# Patient Record
Sex: MALE | Race: WHITE | NOT HISPANIC OR LATINO
[De-identification: names, ages, dates, MRNs, and addresses within clinical notes are randomized per-mention and may not be internally consistent; named-entity substitution may affect disease eponyms.]

---

## 2022-11-17 ENCOUNTER — TRANSCRIPTION ENCOUNTER (OUTPATIENT)
Age: 37
End: 2022-11-17

## 2022-11-17 ENCOUNTER — APPOINTMENT (OUTPATIENT)
Dept: ORTHOPEDIC SURGERY | Facility: CLINIC | Age: 37
End: 2022-11-17

## 2022-11-17 VITALS — HEIGHT: 69 IN | BODY MASS INDEX: 27.4 KG/M2 | WEIGHT: 185 LBS

## 2022-11-17 DIAGNOSIS — Z78.9 OTHER SPECIFIED HEALTH STATUS: ICD-10-CM

## 2022-11-17 PROCEDURE — 99203 OFFICE O/P NEW LOW 30 MIN: CPT

## 2022-11-17 PROCEDURE — 73564 X-RAY EXAM KNEE 4 OR MORE: CPT | Mod: RT

## 2022-11-17 RX ORDER — IBUPROFEN 800 MG/1
800 TABLET, FILM COATED ORAL
Qty: 30 | Refills: 0 | Status: ACTIVE | COMMUNITY
Start: 2022-10-25

## 2022-11-17 NOTE — DISCUSSION/SUMMARY
[de-identified] : The documentation recorded by the scribe accurately reflects the service I personally performed and the decisions made by me.\par I, Peter Batista, attest that this documentation has been prepared under the direction and in the presence of Provider Scout Abraham MD.\par \par The patient was seen by Scout Abraham MD.\par The following radiographs were ordered and read by me during this patient's visit. I reviewed each radiograph in detail with the patient, and discussed the findings as highlighted below.\par

## 2022-11-17 NOTE — ASSESSMENT
[FreeTextEntry1] : Underlying pathology reviewed and treatment options discussed. \par 11/17/2022 : xrays right knee, 4 views,   negative\par Obtain MRI right knee r/o MMT. \par Activity modifier as tolerated.\par Tried 800 mg nsaids but caused GI issues. \par Questions addressed.\par \par

## 2022-11-17 NOTE — HISTORY OF PRESENT ILLNESS
[Gradual] : gradual [8] : 8 [0] : 0 [Localized] : localized [Sharp] : sharp [Tightness] : tightness [Intermittent] : intermittent [Leisure] : leisure [Nothing helps with pain getting better] : Nothing helps with pain getting better [Exercising] : exercising [Stairs] : stairs [] : no [FreeTextEntry1] : right knee [FreeTextEntry5] : New patient is here for right knee. NKI. Pain has been intermittent for 2 years and has become more persistent in the past 3 months. Pain is mostly when he squats.  [FreeTextEntry6] : pulling [de-identified] : squat

## 2022-11-17 NOTE — REASON FOR VISIT
[FreeTextEntry2] : 36yo male with right knee pain ongoing intermittently for years, worsening over the last 3 months. He lifts weights regularly. Was taking NSAIDs without relief, then 3 weeks ago had severe pain which woke him at night. Saw another orthopedist who started him on ibuprofen 800mg which has not helped and is hurting his stomach.

## 2022-11-17 NOTE — PHYSICAL EXAM
[Right] : right knee [5___] : hamstring 5[unfilled]/5 [Equivocal] : equivocal Ginette [] : no deformities of patellar tendon [TWNoteComboBox7] : flexion 130 degrees [de-identified] : extension 0 degrees

## 2022-11-28 ENCOUNTER — FORM ENCOUNTER (OUTPATIENT)
Age: 37
End: 2022-11-28

## 2022-11-29 ENCOUNTER — APPOINTMENT (OUTPATIENT)
Dept: MRI IMAGING | Facility: CLINIC | Age: 37
End: 2022-11-29

## 2022-11-29 PROCEDURE — 73721 MRI JNT OF LWR EXTRE W/O DYE: CPT | Mod: RT

## 2022-12-01 ENCOUNTER — APPOINTMENT (OUTPATIENT)
Dept: ORTHOPEDIC SURGERY | Facility: CLINIC | Age: 37
End: 2022-12-01

## 2022-12-01 VITALS — BODY MASS INDEX: 27.4 KG/M2 | WEIGHT: 185 LBS | HEIGHT: 69 IN

## 2022-12-01 DIAGNOSIS — S76.111A STRAIN OF RIGHT QUADRICEPS MUSCLE, FASCIA AND TENDON, INITIAL ENCOUNTER: ICD-10-CM

## 2022-12-01 PROCEDURE — 99213 OFFICE O/P EST LOW 20 MIN: CPT

## 2022-12-01 NOTE — ASSESSMENT
[FreeTextEntry1] : Underlying pathology reviewed and treatment options discussed. \par 11/17/2022 : xrays right knee, 4 views,   negative\par Obtain MRI right knee r/o MMT. \par Activity modifier as tolerated.\par Tried 800 mg nsaids but caused GI issues. \par Questions addressed.\par \par 12/01/2022: MRI reviewed and discussed.\par reports leg strength is much high than his upper body. \par improve mech and flexibility\par Activity modifier as tolerated.\par Start PT and HEP to improve mechanics and reduce pain.\par Questions addressed.\par \par \par The documentation recorded by the scribe accurately reflects the service I personally performed and the decisions made by me.\par I, Adrian Batistaibkaren, attest that this documentation has been prepared under the direction and in the presence of Provider Scout Abraham MD.\par \par The patient was seen by Scout Abraham MD.\par

## 2022-12-01 NOTE — PHYSICAL EXAM
[Right] : right knee [5___] : hamstring 5[unfilled]/5 [Equivocal] : equivocal Ginette [] : no deformities of patellar tendon [TWNoteComboBox7] : flexion 130 degrees [de-identified] : extension 0 degrees

## 2022-12-01 NOTE — DATA REVIEWED
[MRI] : MRI [Right] : of the right [Knee] : knee [Report was reviewed and noted in the chart] : The report was reviewed and noted in the chart [I reviewed the films/CD and agree] : I reviewed the films/CD and agree [FreeTextEntry1] : Impression: right knee\par 1. Thick medial synovial plica with mild patellofemoral effusion and synovitis.\par 2. Distal quadriceps tendinopathy without tear.\par 3. No acute osseous injury or meniscal tear.\par E-signed by Presley Osborn MD 11/30/2022

## 2022-12-01 NOTE — HISTORY OF PRESENT ILLNESS
[Gradual] : gradual [8] : 8 [0] : 0 [Localized] : localized [Sharp] : sharp [Tightness] : tightness [Intermittent] : intermittent [Leisure] : leisure [Nothing helps with pain getting better] : Nothing helps with pain getting better [Exercising] : exercising [Stairs] : stairs [de-identified] : 12/01/2022\par here for mri review. \par \par 11/17/22: 38yo male with right knee pain ongoing intermittently for years, worsening over the last 3 months. He lifts weights regularly. Was taking NSAIDs without relief, then 3 weeks ago had severe pain which woke him at night. Saw another orthopedist who started him on ibuprofen 800mg which has not helped and is hurting his stomach.  [] : no [FreeTextEntry1] : right knee [FreeTextEntry5] : Patient is here today for MRI results of right knee. Patient states there has been no improvement in pain since last visit.\par  [FreeTextEntry6] : pulling [de-identified] : squat

## 2023-06-16 ENCOUNTER — APPOINTMENT (OUTPATIENT)
Dept: ORTHOPEDIC SURGERY | Facility: CLINIC | Age: 38
End: 2023-06-16
Payer: COMMERCIAL

## 2023-06-16 DIAGNOSIS — S80.01XA CONTUSION OF RIGHT KNEE, INITIAL ENCOUNTER: ICD-10-CM

## 2023-06-16 PROCEDURE — 99213 OFFICE O/P EST LOW 20 MIN: CPT

## 2023-06-16 PROCEDURE — 73562 X-RAY EXAM OF KNEE 3: CPT | Mod: RT

## 2023-06-16 NOTE — HISTORY OF PRESENT ILLNESS
[10] : 10 [0] : 0 [Rest] : rest [Meds] : meds [Stairs] : stairs [de-identified] : 6/16/23:  acute onset of right knee pain after another player landed and fell on his knee on 6/4/23.  pain increased 6/11/23 after playing hockey.  pain continues.  reports to buckling as well.  [] : no [FreeTextEntry1] : right knee  [FreeTextEntry3] : 06/04/2023 [FreeTextEntry5] : pt was playing hockey and he fell and someone then fell on top of him and he injured his right knee almost two weeks ago. pt states he played again a few days ago and felt increased pain  [FreeTextEntry9] : ibuprofen  [de-identified] : movement  [de-identified] : 12/2022 [de-identified] : Dr.Price [de-identified] : 12/2022

## 2023-06-16 NOTE — DISCUSSION/SUMMARY
[de-identified] : Progress Note completed by Lynnette Hood PA-C\par * Dr. Donis -- The documentation recorded in this note accurately reflects the decisions made by me during this visit.

## 2023-06-16 NOTE — ASSESSMENT
[FreeTextEntry1] : acute onset of right knee pain after another player landed and fell on his knee on 6/4/23.  pain increased 6/11/23 after playing hockey.   lateral knee pain and some buckling.  possible lmt versus bone contusion. \par \par works in finance.

## 2023-06-16 NOTE — PHYSICAL EXAM
[NL (0)] : extension 0 degrees [4___] : hamstring 4[unfilled]/5 [Positive] : positive Ginette [] : patient ambulates without assistive device [Right] : right knee [There are no fractures, subluxations or dislocations. No significant abnormalities are seen] : There are no fractures, subluxations or dislocations. No significant abnormalities are seen [TWNoteComboBox7] : flexion 125 degrees

## 2023-06-21 ENCOUNTER — APPOINTMENT (OUTPATIENT)
Dept: MRI IMAGING | Facility: CLINIC | Age: 38
End: 2023-06-21
Payer: COMMERCIAL

## 2023-06-21 PROCEDURE — 73721 MRI JNT OF LWR EXTRE W/O DYE: CPT | Mod: RT

## 2023-06-22 ENCOUNTER — APPOINTMENT (OUTPATIENT)
Dept: ORTHOPEDIC SURGERY | Facility: CLINIC | Age: 38
End: 2023-06-22
Payer: COMMERCIAL

## 2023-06-22 VITALS — BODY MASS INDEX: 27.4 KG/M2 | WEIGHT: 185 LBS | HEIGHT: 69 IN

## 2023-06-22 DIAGNOSIS — S82.141D DISPLACED BICONDYLAR FRACTURE OF RIGHT TIBIA, SUBSEQUENT ENCOUNTER FOR CLOSED FRACTURE WITH ROUTINE HEALING: ICD-10-CM

## 2023-06-22 DIAGNOSIS — M23.91 UNSPECIFIED INTERNAL DERANGEMENT OF RIGHT KNEE: ICD-10-CM

## 2023-06-22 DIAGNOSIS — S83.90XA SPRAIN OF UNSPECIFIED SITE OF UNSPECIFIED KNEE, INITIAL ENCOUNTER: ICD-10-CM

## 2023-06-22 PROCEDURE — 27530 TREAT KNEE FRACTURE: CPT | Mod: RT

## 2023-06-22 PROCEDURE — 99214 OFFICE O/P EST MOD 30 MIN: CPT | Mod: 25

## 2023-06-22 NOTE — DISCUSSION/SUMMARY
[de-identified] : Progress Note completed by Lynnette Hood PA-C\par * Dr. Donis -- The documentation recorded in this note accurately reflects the decisions made by me during this visit.

## 2023-06-22 NOTE — PHYSICAL EXAM
[NL (0)] : extension 0 degrees [4___] : quadriceps 4[unfilled]/5 [Positive] : positive Ginette [Right] : right knee [There are no fractures, subluxations or dislocations. No significant abnormalities are seen] : There are no fractures, subluxations or dislocations. No significant abnormalities are seen [5___] : hamstring 5[unfilled]/5 [] : mildly antalgic [TWNoteComboBox7] : flexion 135 degrees

## 2023-06-22 NOTE — HISTORY OF PRESENT ILLNESS
[8] : 8 [0] : 0 [de-identified] : 6/16/23:  acute onset of right knee pain after another player landed and fell on his knee on 6/4/23.  pain increased 6/11/23 after playing hockey.  pain continues.  reports to buckling as well. \par 6/22/23:  follow up right knee.   mild improvement but still some lateral pain. [FreeTextEntry1] : right knee  [de-identified] : PT/HEP

## 2023-06-22 NOTE — ASSESSMENT
[FreeTextEntry1] : acute onset of right knee pain after another player landed and fell on his knee on 6/4/23.  pain increased 6/11/23 after playing hockey.  mri 2023 shows non displaced fracture lateral plateau. feeling better today with mild pain with weight bearing.\par \par works in finance.

## 2023-07-27 ENCOUNTER — APPOINTMENT (OUTPATIENT)
Dept: ORTHOPEDIC SURGERY | Facility: CLINIC | Age: 38
End: 2023-07-27

## 2023-07-31 ENCOUNTER — APPOINTMENT (OUTPATIENT)
Dept: ORTHOPEDIC SURGERY | Facility: CLINIC | Age: 38
End: 2023-07-31
Payer: COMMERCIAL

## 2023-07-31 VITALS — WEIGHT: 185 LBS | BODY MASS INDEX: 27.4 KG/M2 | HEIGHT: 69 IN

## 2023-07-31 DIAGNOSIS — M25.551 PAIN IN RIGHT HIP: ICD-10-CM

## 2023-07-31 DIAGNOSIS — Z00.00 ENCOUNTER FOR GENERAL ADULT MEDICAL EXAMINATION W/OUT ABNORMAL FINDINGS: ICD-10-CM

## 2023-07-31 DIAGNOSIS — S76.219D: ICD-10-CM

## 2023-07-31 PROCEDURE — 99214 OFFICE O/P EST MOD 30 MIN: CPT

## 2023-07-31 PROCEDURE — 73502 X-RAY EXAM HIP UNI 2-3 VIEWS: CPT

## 2023-08-02 PROBLEM — S76.219D: Status: ACTIVE | Noted: 2023-08-02

## 2023-08-02 RX ORDER — MELOXICAM 15 MG/1
15 TABLET ORAL
Qty: 30 | Refills: 1 | Status: ACTIVE | COMMUNITY
Start: 2023-08-02 | End: 1900-01-01

## 2023-08-02 NOTE — PHYSICAL EXAM
[] : adductor tenderness [Right] : right hip with pelvis [There are no fractures, subluxations or dislocations. No significant abnormalities are seen] : There are no fractures, subluxations or dislocations. No significant abnormalities are seen

## 2023-08-02 NOTE — ASSESSMENT
[FreeTextEntry1] : 38M p/w R groin strain, poss labrum tear  Begin PT Meloxicam for pain return 6-8 weeks   The patient was advised of the diagnosis. The natural history of the pathology was explained in full to the patient in layman's terms. All questions were answered. The risks and benefits of surgical and non-surgical treatment alternatives were explained in full to the patient.

## 2023-08-02 NOTE — HISTORY OF PRESENT ILLNESS
[Sports related] : sports related [9] : 9 [6] : 6 [Dull/Aching] : dull/aching [Sharp] : sharp [Constant] : constant [de-identified] : 7/31/23: 37yo M with right hip and groin pain for the past 3 weeks after he had been playing recreational hockey. States he felt a sudden sharp pain. He had tried rest and NSAIDs with mild relief. He tried resuming activity yesterday but still had much pain.  [] : no [FreeTextEntry1] : right groin [FreeTextEntry5] : 07/31/2023 Mr. MONSERRAT MALIN is a 38-year M presents today for evaluation of right groin, pt states he was paying hockey when he felt a sharp pain on his groin 3 weeks ago.